# Patient Record
Sex: FEMALE | Race: ASIAN | URBAN - METROPOLITAN AREA
[De-identification: names, ages, dates, MRNs, and addresses within clinical notes are randomized per-mention and may not be internally consistent; named-entity substitution may affect disease eponyms.]

---

## 2020-01-30 ENCOUNTER — OFFICE VISIT (OUTPATIENT)
Dept: FAMILY MEDICINE CLINIC | Age: 25
End: 2020-01-30

## 2020-01-30 VITALS
HEART RATE: 80 BPM | DIASTOLIC BLOOD PRESSURE: 69 MMHG | RESPIRATION RATE: 18 BRPM | WEIGHT: 114.6 LBS | HEIGHT: 60 IN | SYSTOLIC BLOOD PRESSURE: 100 MMHG | OXYGEN SATURATION: 99 % | BODY MASS INDEX: 22.5 KG/M2 | TEMPERATURE: 98.2 F

## 2020-01-30 DIAGNOSIS — J01.90 ACUTE NON-RECURRENT SINUSITIS, UNSPECIFIED LOCATION: Primary | ICD-10-CM

## 2020-01-30 DIAGNOSIS — J06.9 VIRAL URI: ICD-10-CM

## 2020-01-30 RX ORDER — AMOXICILLIN AND CLAVULANATE POTASSIUM 875; 125 MG/1; MG/1
1 TABLET, FILM COATED ORAL EVERY 12 HOURS
Qty: 14 TAB | Refills: 0 | Status: SHIPPED | OUTPATIENT
Start: 2020-01-30 | End: 2020-02-06

## 2020-01-30 NOTE — PATIENT INSTRUCTIONS
To prevent c-diff, a serious colon infection caused by antibiotics, take a probiotic (available over-the-counter) daily between antibiotic dosages and for several days after finishing the antibiotic. Sinusitis: Care Instructions Your Care Instructions Sinusitis is an infection of the lining of the sinus cavities in your head. Sinusitis often follows a cold. It causes pain and pressure in your head and face. In most cases, sinusitis gets better on its own in 1 to 2 weeks. But some mild symptoms may last for several weeks. Sometimes antibiotics are needed. Follow-up care is a key part of your treatment and safety. Be sure to make and go to all appointments, and call your doctor if you are having problems. It's also a good idea to know your test results and keep a list of the medicines you take. How can you care for yourself at home? · Take an over-the-counter pain medicine, such as acetaminophen (Tylenol), ibuprofen (Advil, Motrin), or naproxen (Aleve). Read and follow all instructions on the label. · If the doctor prescribed antibiotics, take them as directed. Do not stop taking them just because you feel better. You need to take the full course of antibiotics. · Be careful when taking over-the-counter cold or flu medicines and Tylenol at the same time. Many of these medicines have acetaminophen, which is Tylenol. Read the labels to make sure that you are not taking more than the recommended dose. Too much acetaminophen (Tylenol) can be harmful. · Breathe warm, moist air from a steamy shower, a hot bath, or a sink filled with hot water. Avoid cold, dry air. Using a humidifier in your home may help. Follow the directions for cleaning the machine. · Use saline (saltwater) nasal washes to help keep your nasal passages open and wash out mucus and bacteria. You can buy saline nose drops at a grocery store or drugstore.  Or you can make your own at home by adding 1 teaspoon of salt and 1 teaspoon of baking soda to 2 cups of distilled water. If you make your own, fill a bulb syringe with the solution, insert the tip into your nostril, and squeeze gently. Blanquita Hals your nose. · Put a hot, wet towel or a warm gel pack on your face 3 or 4 times a day for 5 to 10 minutes each time. · Try a decongestant nasal spray like oxymetazoline (Afrin). Do not use it for more than 3 days in a row. Using it for more than 3 days can make your congestion worse. When should you call for help? Call your doctor now or seek immediate medical care if: 
  · You have new or worse swelling or redness in your face or around your eyes.  
  · You have a new or higher fever.  
 Watch closely for changes in your health, and be sure to contact your doctor if: 
  · You have new or worse facial pain.  
  · The mucus from your nose becomes thicker (like pus) or has new blood in it.  
  · You are not getting better as expected. Where can you learn more? Go to http://geneva-manuel.info/. Enter W648 in the search box to learn more about \"Sinusitis: Care Instructions. \" Current as of: October 21, 2018 Content Version: 12.2 © 5976-3045 [employer]. Care instructions adapted under license by [employer] (which disclaims liability or warranty for this information). If you have questions about a medical condition or this instruction, always ask your healthcare professional. [place] [address] any warranty or liability for your use of this information.

## 2020-01-30 NOTE — PROGRESS NOTES
Emile Matta [facility]  [address] [place], [address]  [phone]             Date of visit: 1/30/2020   Subjective:      History obtained from:  the patient. Rajeev Johnston is a 27 y.o. female who presents today for sick for 2 weeks, feels awful, not really getting better   Started Saturday almost 2 weeks ago  Fever is better  Runny nose getting worse  Eye swelling/runny new yesterday, both eyes, first just the right, now both  Still has the body aches    3yr daughter runny nose and cough just started today  Gave her honey  No fever they notice  daughter was sick earlier this month. New patient to me  Usually very healthy    Chief Complaint   Patient presents with    New Patient     establish care    Cough     [facility]         No Known Allergies  History reviewed. No pertinent past medical history. History reviewed. No pertinent surgical history. Family History   Problem Relation Age of Onset    No Known Problems Mother     No Known Problems Father      Social History     Tobacco Use    Smoking status: Never Smoker    Smokeless tobacco: Never Used   Substance Use Topics    Alcohol use: Not Currently      Social History     Patient does not qualify to have social determinant information on file (likely too young). Social History Narrative    [employer] student    From [place]    Lives with  and daughter born 2016        Review of Systems  Skin: denies rash   GI: denies nausea, vomiting, or diarrhea        Objective:     Vitals:    01/30/20 1144   BP: 100/69   Pulse: 80   Resp: 18   Temp: 98.2 °F (36.8 °C)   TempSrc: Oral   SpO2: 99%   Weight: 114 lb 9.6 oz (52 kg)   Height: 5' (1.524 m)     Body mass index is 22.38 kg/m².      General: stated age, well nourished, and in NAD but appears ill  Eyes: lids swollen bilaterally (mildly so, worse on right), conjunctiva mildly injected and watery bilaterally  Neck: supple, symmetrical, trachea midline, no adenopathy and thyroid: not enlarged, symmetric, no tenderness/mass/nodules  Ears: TMs clear bilaterally, canals patent without inflammation  Nose: yellow drainage, turbinates very swollen/inflammed bilaterally  Throat: clear, no tonsillar exudate or erthema  Mouth: no lesions noted  Lungs:  clear to auscultation w/o rales, rhonchi, wheezes w/normal effort and no use of accessory muscles of respiration   Heart: regular rate and rhythm, S1, S2 normal, no murmur, click, rub or gallop  Lymph: no cervical adenopathy appreciated  Ext: no edema  Skin:  No rashes or lesions     Assessment/Plan:       ICD-10-CM ICD-9-CM    1. Acute non-recurrent sinusitis, unspecified location J01.90 461.9    2. Viral URI J06.9 465.9         Orders Placed This Encounter    amoxicillin-clavulanate (AUGMENTIN) 875-125 mg per tablet       Likely 1 or 2 viral illnesses together  Young daughter in  so going to get some viruses  However, concerning that sinus symptoms worsening, more drainage and more facial pain  Going to try abx  Reviewed worrisome signs or symptoms for which to call. Discussed the diagnosis and plan and she expressed understanding. Follow-up and Dispositions    · Return if symptoms worsen or fail to improve.          Deborah Neumann MD

## 2020-01-30 NOTE — PROGRESS NOTES
Chief Complaint   Patient presents with    New Patient     establish care    Cough     [facility]       1. Have you been to the ER, urgent care clinic since your last visit? Hospitalized since your last visit? No    2. Have you seen or consulted any other health care providers outside of the [address] [place] [address] since your last visit? Include any pap smears or colon screening.  No

## 2020-01-30 NOTE — LETTER
NOTIFICATION RETURN TO WORK / SCHOOL 
 
1/30/2020 12:31 PM 
 
Ms. Love Roberson [facility] [address] [place] [address] To Whom It May Concern: 
 
Love Roberson is currently under the care of MIKO Smith. She missed yesterday due to illness She will return to work/school on: 2/3/20 without restrictions. If there are questions or concerns please have the patient contact our office. Sincerely, Aysha Mcgraw MD

## 2021-03-28 LAB — SARS-COV-2, NAA: NOT DETECTED
